# Patient Record
Sex: MALE | Race: OTHER | HISPANIC OR LATINO | URBAN - METROPOLITAN AREA
[De-identification: names, ages, dates, MRNs, and addresses within clinical notes are randomized per-mention and may not be internally consistent; named-entity substitution may affect disease eponyms.]

---

## 2017-03-21 ENCOUNTER — EMERGENCY (EMERGENCY)
Facility: HOSPITAL | Age: 32
LOS: 1 days | Discharge: HOME | End: 2017-03-21
Attending: EMERGENCY MEDICINE | Admitting: EMERGENCY MEDICINE
Payer: COMMERCIAL

## 2017-03-21 VITALS
OXYGEN SATURATION: 99 % | TEMPERATURE: 97 F | DIASTOLIC BLOOD PRESSURE: 95 MMHG | SYSTOLIC BLOOD PRESSURE: 146 MMHG | RESPIRATION RATE: 18 BRPM | HEART RATE: 76 BPM

## 2017-03-21 LAB
ALBUMIN SERPL ELPH-MCNC: 4.6 G/DL — SIGNIFICANT CHANGE UP (ref 3.3–5)
ALP SERPL-CCNC: 62 U/L — SIGNIFICANT CHANGE UP (ref 40–120)
ALT FLD-CCNC: 41 U/L — SIGNIFICANT CHANGE UP (ref 4–41)
AST SERPL-CCNC: 27 U/L — SIGNIFICANT CHANGE UP (ref 4–40)
BASOPHILS # BLD AUTO: 0.03 K/UL — SIGNIFICANT CHANGE UP (ref 0–0.2)
BASOPHILS NFR BLD AUTO: 0.4 % — SIGNIFICANT CHANGE UP (ref 0–2)
BILIRUB SERPL-MCNC: 1.5 MG/DL — HIGH (ref 0.2–1.2)
BUN SERPL-MCNC: 16 MG/DL — SIGNIFICANT CHANGE UP (ref 7–23)
CALCIUM SERPL-MCNC: 10 MG/DL — SIGNIFICANT CHANGE UP (ref 8.4–10.5)
CHLORIDE SERPL-SCNC: 101 MMOL/L — SIGNIFICANT CHANGE UP (ref 98–107)
CO2 SERPL-SCNC: 27 MMOL/L — SIGNIFICANT CHANGE UP (ref 22–31)
CREAT SERPL-MCNC: 1.11 MG/DL — SIGNIFICANT CHANGE UP (ref 0.5–1.3)
EOSINOPHIL # BLD AUTO: 0.11 K/UL — SIGNIFICANT CHANGE UP (ref 0–0.5)
EOSINOPHIL NFR BLD AUTO: 1.3 % — SIGNIFICANT CHANGE UP (ref 0–6)
GLUCOSE SERPL-MCNC: 82 MG/DL — SIGNIFICANT CHANGE UP (ref 70–99)
HBA1C BLD-MCNC: 6 % — HIGH (ref 4–5.6)
HCT VFR BLD CALC: 48.4 % — SIGNIFICANT CHANGE UP (ref 39–50)
HGB BLD-MCNC: 16.5 G/DL — SIGNIFICANT CHANGE UP (ref 13–17)
IMM GRANULOCYTES NFR BLD AUTO: 0.1 % — SIGNIFICANT CHANGE UP (ref 0–1.5)
LYMPHOCYTES # BLD AUTO: 3.2 K/UL — SIGNIFICANT CHANGE UP (ref 1–3.3)
LYMPHOCYTES # BLD AUTO: 38 % — SIGNIFICANT CHANGE UP (ref 13–44)
MCHC RBC-ENTMCNC: 30.9 PG — SIGNIFICANT CHANGE UP (ref 27–34)
MCHC RBC-ENTMCNC: 34.1 % — SIGNIFICANT CHANGE UP (ref 32–36)
MCV RBC AUTO: 90.6 FL — SIGNIFICANT CHANGE UP (ref 80–100)
MONOCYTES # BLD AUTO: 0.47 K/UL — SIGNIFICANT CHANGE UP (ref 0–0.9)
MONOCYTES NFR BLD AUTO: 5.6 % — SIGNIFICANT CHANGE UP (ref 2–14)
NEUTROPHILS # BLD AUTO: 4.59 K/UL — SIGNIFICANT CHANGE UP (ref 1.8–7.4)
NEUTROPHILS NFR BLD AUTO: 54.6 % — SIGNIFICANT CHANGE UP (ref 43–77)
PLATELET # BLD AUTO: 260 K/UL — SIGNIFICANT CHANGE UP (ref 150–400)
PMV BLD: 10.4 FL — SIGNIFICANT CHANGE UP (ref 7–13)
POTASSIUM SERPL-MCNC: 4.2 MMOL/L — SIGNIFICANT CHANGE UP (ref 3.5–5.3)
POTASSIUM SERPL-SCNC: 4.2 MMOL/L — SIGNIFICANT CHANGE UP (ref 3.5–5.3)
PROT SERPL-MCNC: 8.2 G/DL — SIGNIFICANT CHANGE UP (ref 6–8.3)
RBC # BLD: 5.34 M/UL — SIGNIFICANT CHANGE UP (ref 4.2–5.8)
RBC # FLD: 12.5 % — SIGNIFICANT CHANGE UP (ref 10.3–14.5)
SODIUM SERPL-SCNC: 142 MMOL/L — SIGNIFICANT CHANGE UP (ref 135–145)
WBC # BLD: 8.41 K/UL — SIGNIFICANT CHANGE UP (ref 3.8–10.5)
WBC # FLD AUTO: 8.41 K/UL — SIGNIFICANT CHANGE UP (ref 3.8–10.5)

## 2017-03-21 PROCEDURE — 70450 CT HEAD/BRAIN W/O DYE: CPT | Mod: 26

## 2017-03-21 PROCEDURE — 99220: CPT

## 2017-03-21 RX ORDER — SODIUM CHLORIDE 9 MG/ML
1000 INJECTION INTRAMUSCULAR; INTRAVENOUS; SUBCUTANEOUS ONCE
Qty: 0 | Refills: 0 | Status: COMPLETED | OUTPATIENT
Start: 2017-03-21 | End: 2017-03-21

## 2017-03-21 RX ORDER — ACETAMINOPHEN 500 MG
650 TABLET ORAL ONCE
Qty: 0 | Refills: 0 | Status: COMPLETED | OUTPATIENT
Start: 2017-03-21 | End: 2017-03-21

## 2017-03-21 RX ORDER — METOCLOPRAMIDE HCL 10 MG
10 TABLET ORAL ONCE
Qty: 0 | Refills: 0 | Status: COMPLETED | OUTPATIENT
Start: 2017-03-21 | End: 2017-03-21

## 2017-03-21 RX ORDER — SODIUM CHLORIDE 9 MG/ML
1000 INJECTION INTRAMUSCULAR; INTRAVENOUS; SUBCUTANEOUS
Qty: 0 | Refills: 0 | Status: DISCONTINUED | OUTPATIENT
Start: 2017-03-21 | End: 2017-03-25

## 2017-03-21 RX ADMIN — Medication 650 MILLIGRAM(S): at 14:26

## 2017-03-21 RX ADMIN — Medication 10 MILLIGRAM(S): at 14:26

## 2017-03-21 RX ADMIN — SODIUM CHLORIDE 1000 MILLILITER(S): 9 INJECTION INTRAMUSCULAR; INTRAVENOUS; SUBCUTANEOUS at 14:26

## 2017-03-21 RX ADMIN — SODIUM CHLORIDE 150 MILLILITER(S): 9 INJECTION INTRAMUSCULAR; INTRAVENOUS; SUBCUTANEOUS at 20:29

## 2017-03-21 NOTE — ED CDU PROVIDER NOTE - OBJECTIVE STATEMENT
30 y/o male with no significant PMHx presents to the ER c/o 2 weeks of intermittent headache with associated blurred vision.  Pt reports feeling fatigue and dizzy over the past 2 weeks.  Blurred vision only occurs with headache.  Pt wears both contacts and glasses.  Pt denies head trauma, loc, fevers, chills, recent illness, numbness, tingling.

## 2017-03-21 NOTE — ED CDU PROVIDER NOTE - ATTENDING CONTRIBUTION TO CARE
Giuseppe OAKLEY: 32 y/o M with no significant PMH here with 3 days of bilateral blurry vision, frontal headaches.  Pt reports sxs have been intermittent but persistent at home.  No known exacerbating or relieving factors.  Pt denies any h/o similar sxs in the past.  No recent illnesses or other complaints.  Pt wears glasses.  Evaluattion in ER with normal visual acuity/ophtho exam, CT head with possible intracranial hypotension.  Pt is currently asymptomatic with no vision changes or headache.  Well appearing, lying comfortably in stretcher, awake and alert, nontoxic.  VSS.  Lungs cta bl.  Cards nl S1/S2, RRR, no MRG.  Abd soft ntnd.  No focal neuro deficits.  Plan for ophtho and neuro eval, MRI brain, reassess.

## 2017-03-21 NOTE — ED CDU PROVIDER NOTE - PROGRESS NOTE DETAILS
CDU KATHARINA Ramos Addendum------  This patient was signed out to me by CDU KATHARINA Joseph and CDU attending Dr. Chin on 03/21/17 at 1900 hrs; test results reviewed.  In interim, pt has been resting comfortably; no complaints in interim.  Pt pending MRI study, stable at present; will continue to monitor / reassess. KATHARINA Huertas Discharge Note:  Pt endorses resolution of symptoms.  Pt has no active complaints at this time.  Repeat neuro exam benign without any e/o neuro deficits.  MRI normal.  Pt seen by neurology who recommends discharge, tylenol PRN and outpatient follow up.  Pt medically stable for discharge. CDU ATTG DISPO NOTE - Dr. Avilez - 32 yo M no PMHx who presented to the ED for headache associated with blurred vision x 2 weeks. CT Head suggestive of intracranial hypotension. Pt sent to CDU for MR imaging, neuro and opthalmology cs. In CDU pt symptomatically improved. Exam s/ focal deficits. MR imaging revealed no acute pathology. Consultant recs appreciated. Pt stable for discharge. PMD follow up within 24 - 48 hours. DC instructions and warning signs for return given. CDU ATTG DISPO NOTE - Dr. Avilez - 32 yo M no PMHx who presented to the ED for headache associated with blurred vision x 2 weeks. CT Head suggestive of intracranial hypotension. Pt sent to CDU for MR imaging, neuro and opthalmology cs. In CDU pt symptomatically improved. Exam s/ focal deficits. MR imaging revealed no acute pathology. Consultant recs appreciated. Pt stable for discharge. PMD follow up within 24 - 48 hours. DC instructions and warning signs for return given.    I Maylin M.D. have examined the patient and confirmed the essential components of the history, physical examination, diagnosis, and treatment plan. I agree with the patient's care as documented by the PA and amended herein by me. See note above for complete details of service.

## 2017-03-21 NOTE — ED CDU PROVIDER NOTE - PLAN OF CARE
Rest, drink plenty of fluids.  Advance activity as tolerated.  Continue all previously prescribed medications as directed.  Take Tylenol 650mg (Two 325 mg pills) every 4-6 hours as needed for pain. Follow up with your primary care physician and neurology (referral list provided) in 48-72 hours- bring copies of your results.  Return to the ER for worsening or persistent symptoms, and/or ANY NEW OR CONCERNING SYMPTOMS. If you have issues obtaining follow up, please call: [phone] to obtain a doctor or specialist who takes your insurance in your area.

## 2017-03-21 NOTE — ED PROVIDER NOTE - PROGRESS NOTE DETAILS
Hammad OAKLEY- given the CT head findings, there are concerns for intracranial hypotension, plan to call ophtho to r/o papilledema, plan to do MRI brain with contrast as further work up spoke to patient, and agree with MRI and CDU stay, called neurology as well

## 2017-03-21 NOTE — ED PROVIDER NOTE - PMH
Childhood Asthma  last attack @ 11 yrs old, pt denies any hospitalization for asthma.  Mass Right Thumb    Migraines    Obesity

## 2017-03-21 NOTE — ED PROVIDER NOTE - CRANIAL NERVE AND PUPILLARY EXAM
cranial nerves 2-12 intact central and peripheral vision intact/gag reflex intact/corneal reflex intact/cranial nerves 2-12 intact/cough reflex intact

## 2017-03-21 NOTE — ED PROVIDER NOTE - NS ED MD SCRIBE ATTENDING SCRIBE SECTIONS
DISPOSITION/PAST MEDICAL/SURGICAL/SOCIAL HISTORY/HISTORY OF PRESENT ILLNESS/PHYSICAL EXAM/VITAL SIGNS( Pullset)/REVIEW OF SYSTEMS

## 2017-03-22 VITALS
HEART RATE: 70 BPM | TEMPERATURE: 98 F | RESPIRATION RATE: 14 BRPM | SYSTOLIC BLOOD PRESSURE: 128 MMHG | OXYGEN SATURATION: 99 % | DIASTOLIC BLOOD PRESSURE: 65 MMHG

## 2017-03-22 PROCEDURE — 99217: CPT

## 2017-03-22 PROCEDURE — 70552 MRI BRAIN STEM W/DYE: CPT | Mod: 26

## 2017-03-22 PROCEDURE — 70551 MRI BRAIN STEM W/O DYE: CPT | Mod: 26,59

## 2017-03-22 RX ADMIN — SODIUM CHLORIDE 150 MILLILITER(S): 9 INJECTION INTRAMUSCULAR; INTRAVENOUS; SUBCUTANEOUS at 04:56

## 2018-06-11 ENCOUNTER — EMERGENCY (EMERGENCY)
Facility: HOSPITAL | Age: 33
LOS: 1 days | Discharge: HOME | End: 2018-06-11
Attending: EMERGENCY MEDICINE | Admitting: EMERGENCY MEDICINE
Payer: COMMERCIAL

## 2018-06-11 VITALS
RESPIRATION RATE: 16 BRPM | HEART RATE: 70 BPM | OXYGEN SATURATION: 100 % | TEMPERATURE: 98 F | SYSTOLIC BLOOD PRESSURE: 143 MMHG | DIASTOLIC BLOOD PRESSURE: 92 MMHG

## 2018-06-11 PROCEDURE — 99283 EMERGENCY DEPT VISIT LOW MDM: CPT

## 2018-06-11 RX ORDER — CEPHALEXIN 500 MG
1 CAPSULE ORAL
Qty: 20 | Refills: 0 | OUTPATIENT
Start: 2018-06-11 | End: 2018-06-20

## 2018-06-11 RX ORDER — HYDROCORTISONE 1 %
1 OINTMENT (GRAM) TOPICAL
Qty: 1 | Refills: 0 | OUTPATIENT
Start: 2018-06-11 | End: 2018-06-24

## 2018-06-11 NOTE — ED PROVIDER NOTE - MEDICAL DECISION MAKING DETAILS
Rash-previously diagnosed with Zoster on antivirals.  Rash inconsistent with Zoster and lack of pain inconsistent with Zoster.  Dermatitis, possible suprainfection given adenopathy.  Will cover with abx and topical steroids for pruritis.  Benadryl to help with itch if needed secondary to patient c/o difficulty sleeping secondary to itchy rash.  Possible 25-50 qHS OTC - DO NOT DRIVE ON THIS MEDICATION.

## 2018-06-11 NOTE — ED PROVIDER NOTE - CONDUCTED A DETAILED DISCUSSION WITH PATIENT AND/OR GUARDIAN REGARDING, MDM
need for outpatient follow-up return to ED if symptoms worsen, persist or questions arise/need for outpatient follow-up/Dermatology referral list provided.

## 2018-06-11 NOTE — ED ADULT TRIAGE NOTE - CHIEF COMPLAINT QUOTE
states" I have shingles to my left side of the face and now its spreading to my left eye and I feel swelling to my glands on my neck".
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
## 2021-04-07 ENCOUNTER — TRANSCRIPTION ENCOUNTER (OUTPATIENT)
Age: 36
End: 2021-04-07

## 2021-04-11 ENCOUNTER — TRANSCRIPTION ENCOUNTER (OUTPATIENT)
Age: 36
End: 2021-04-11

## 2021-04-19 ENCOUNTER — TRANSCRIPTION ENCOUNTER (OUTPATIENT)
Age: 36
End: 2021-04-19

## 2021-10-17 ENCOUNTER — EMERGENCY (EMERGENCY)
Facility: HOSPITAL | Age: 36
LOS: 1 days | Discharge: HOME | End: 2021-10-17
Attending: STUDENT IN AN ORGANIZED HEALTH CARE EDUCATION/TRAINING PROGRAM | Admitting: STUDENT IN AN ORGANIZED HEALTH CARE EDUCATION/TRAINING PROGRAM
Payer: SELF-PAY

## 2021-10-17 VITALS
DIASTOLIC BLOOD PRESSURE: 86 MMHG | TEMPERATURE: 98 F | HEART RATE: 70 BPM | SYSTOLIC BLOOD PRESSURE: 141 MMHG | OXYGEN SATURATION: 100 % | HEIGHT: 78 IN | RESPIRATION RATE: 16 BRPM

## 2021-10-17 VITALS
DIASTOLIC BLOOD PRESSURE: 72 MMHG | OXYGEN SATURATION: 100 % | TEMPERATURE: 99 F | RESPIRATION RATE: 17 BRPM | SYSTOLIC BLOOD PRESSURE: 130 MMHG | HEART RATE: 63 BPM

## 2021-10-17 PROBLEM — G43.909 MIGRAINE, UNSPECIFIED, NOT INTRACTABLE, WITHOUT STATUS MIGRAINOSUS: Chronic | Status: ACTIVE | Noted: 2017-03-21

## 2021-10-17 PROCEDURE — 73130 X-RAY EXAM OF HAND: CPT | Mod: 26,LT

## 2021-10-17 PROCEDURE — 99284 EMERGENCY DEPT VISIT MOD MDM: CPT

## 2021-10-17 RX ORDER — TETANUS TOXOID, REDUCED DIPHTHERIA TOXOID AND ACELLULAR PERTUSSIS VACCINE, ADSORBED 5; 2.5; 8; 8; 2.5 [IU]/.5ML; [IU]/.5ML; UG/.5ML; UG/.5ML; UG/.5ML
0.5 SUSPENSION INTRAMUSCULAR ONCE
Refills: 0 | Status: COMPLETED | OUTPATIENT
Start: 2021-10-17 | End: 2021-10-17

## 2021-10-17 RX ADMIN — TETANUS TOXOID, REDUCED DIPHTHERIA TOXOID AND ACELLULAR PERTUSSIS VACCINE, ADSORBED 0.5 MILLILITER(S): 5; 2.5; 8; 8; 2.5 SUSPENSION INTRAMUSCULAR at 15:01

## 2021-10-17 NOTE — ED ADULT TRIAGE NOTE - CHIEF COMPLAINT QUOTE
Pt. states he is a  and while cleaning was stuck in left thumb with a needle containing Pralidoxime chloride (nerve agent antidote). Was not injected with medication. c/o tingling to left arm.

## 2021-10-17 NOTE — ED PROVIDER NOTE - NSFOLLOWUPINSTRUCTIONS_ED_ALL_ED_FT
You were see You were seen today in the ED for a needlestick. Xrays were done to evaluate for any remaining foreign body. A prescription for Augmentin was sent to your pharmacy. Please take as prescribed. Tetanus prophylaxis was also provided.     Please return to the ED if you experience any of the following:   Worsening swelling, numbness, tingling, bleeding, limited mobility of hand or any concerning symptoms.

## 2021-10-17 NOTE — ED PROVIDER NOTE - NS ED ROS FT
CONSTITUTIONAL: No fevers, no chills, no lightheadedness, no dizziness  EYES: no visual changes, no eye pain  EARS: no ear drainage, no ear pain, no change in hearing  NOSE: no nasal congestion  MOUTH/THROAT: no sore throat  CV: No chest pain, no palpitations  RESP: No SOB, no cough  GI: No n/v/d, no abd pain  : no dysuria, no hematuria, no flank pain  MSK: no back pain, no extremity pain  SKIN: see HPI   NEURO: no headache, no focal weakness, no decreased sensation/parasthesias   PSYCHIATRIC: no known mental health issues

## 2021-10-17 NOTE — ED PROVIDER NOTE - OBJECTIVE STATEMENT
Patient is a 36 y/o M with no PMH who presents to the ED after being stuck with a needle from a Pralidoximine injection. Patient is a  at a school and was discarding items remaining from an EMT class. He place his finger on a Pralidoxamine injection and was struck on distal palmar surface of L thumb. States that he immediately moved his finger and minimal amount of agent was injected. Swelling has gone down over past two hours. No dizziness, blurriness, Patient is a 34 y/o M with no PMH who presents to the ED after being stuck with a needle from a Pralidoximine injection. Patient is a  at a school and was discarding items remaining from an EMT class. He place his finger on a Pralidoxamine injection and was struck on distal palmar surface of L thumb.Was not wearing gloves.  States that he immediately moved his finger and minimal amount of agent was injected. Swelling has gone down over past two hours. No dizziness, blurriness, hearing changes, n/v. Last tetanus 8 yrs ago.

## 2021-10-17 NOTE — ED PROVIDER NOTE - PHYSICAL EXAMINATION
General: Alert and Orientated x 3. No apparent distress.  Head: Normocephalic and atraumatic.  Eyes: PERRLA with EOMI.  Neck: Supple. Trachea midline.   Cardiac: Normal S1 and S2 w/ RRR. No murmurs appreciated. Pulses palpable.   Pulmonary: Vesicular breath sounds bilaterally. No increased WOB. No wheezes or crackles.  Abdominal: Soft, non-tender. (+) bowel sounds appreciated in all 4 quadrants. No hepatosplenomegaly.   Neurologic: No focal sensory or motor deficits.  Musculoskeletal: Strength appropriate in all 4 extremities for age with no limited ROM. L hand: puncture site on L palmar distal thumb with surrounding ecchymosis. Swelling L>R thumb. No bleeding. Extremity soft, non-tender.   Skin: Color appropriate for race. Intact, warm, and well-perfused.  Psychiatric: Appropriate mood and affect. No apparent risk to self or others.

## 2021-10-17 NOTE — ED PROVIDER NOTE - CLINICAL SUMMARY MEDICAL DECISION MAKING FREE TEXT BOX
36 y/o M with needle stick from paraldoxamine injection. No systemic signs at this time. There is swelling and bruising at L palmar distal thumb. Neurovascularly intact. Vitals stable. Will consult hand surgery and toxicology for recommendations. Xray hand for any foreign object. Tdap for prophylaxis. Dispo- pendiong consults and xray.

## 2021-10-17 NOTE — ED PROVIDER NOTE - NSICDXPASTMEDICALHX_GEN_ALL_CORE_FT
PAST MEDICAL HISTORY:  Childhood Asthma last attack @ 11 yrs old, pt denies any hospitalization for asthma.    Mass Right Thumb     Migraines     Obesity

## 2021-10-17 NOTE — ED PROVIDER NOTE - PATIENT PORTAL LINK FT
You can access the FollowMyHealth Patient Portal offered by [facility] by registering at the following website: [email or web address]. By joining [facility]’s FollowMyHealth portal, you will also be able to view your health information using other applications (apps) compatible with our system.

## 2021-10-17 NOTE — CONSULT NOTE ADULT - SUBJECTIVE AND OBJECTIVE BOX
MEDICAL TOXICOLOGY CONSULT    HPI: 35 Yr old male presents after accidental auto-injection of Pralidoxime 300mg to his right thumb at 10 am.    Per patient, was cleaning after a session conducted by EMS when this happened. Upon injection immediately withdrew his hand, and noted not all of the fluid was injected. Was not wearing any gloves during that time. Since then feels slight pain at site of injection. Denies any more symptoms.  The Pralidoxime auto-injector was  as of 2011.     ONSET / TIME of exposure(s): 10 AM    QUANTITY of exposure(s): Not applicable (Autoinjector contains a total of 2 ml, 300 mg/ml)    ROUTE of exposure: SQ injection     CONTEXT of exposure: at work     ASSOCIATED symptoms: Slight pain on site of injection     MEDICATION HISTORY: None    RECREATIONAL / ETHANOL / SUPPLEMENT USE: None    REVIEW OF SYSTEMS:       General:  no fever or fatigue  Eyes:  no blurry vision, double vision, or diminished acuity  ENT:  no decreased acuity, sore throat, dysphagia  Cardiac: no chest pain, syncope, or palpitations  Lung:  no cough, shortness of breath  GI:  no abdominal pain, nausea, vomiting, diarrhea, or constipation  Genitourinary: no dysuria  Skin: no rash, lesions, or pruritis  Neuro:  no headache, weakness/numbness, ataxia, change in speech, dizziness, tremor, or seizure    PHYSICAL EXAM    General:    Head:  normocephalic & atraumatic  Eyes:  extra-occular movement is intact  Pupils:  4 mm, symmetric, reactive to light  Ear, nose, throat:  mucosa is moist  Neck:  supple  Respiratory:  Normal effort, clear to auscultation bilaterally, no rales/ronchi/wheezing  Cardiac:  rate is 70, normal rhythm, no rubs/murmurs/gallops  Abdomen:  Soft, nondistended, nontender, +bowel sounds  :  deferred  Skin:  Normal  Neurologic:  Cranial nerves II-XII intact, No motor/sensory weakness.   LOCAL Wound exam: Local exam of left thumb, single injection point identified on the central volar aspect of distal phalange of thumb. No obvious swelling, bleeding, discharge. No restriction of joint movements. No pain on active/passive motion. No sensory weakness. No other similar injection related trauma noted.     Vital Signs Last 24 Hrs  T(C): 36.8 (17 Oct 2021 11:32), Max: 36.8 (17 Oct 2021 11:32)  T(F): 98.3 (17 Oct 2021 11:32), Max: 98.3 (17 Oct 2021 11:32)  HR: 70 (17 Oct 2021 11:32) (70 - 70)  BP: 141/86 (17 Oct 2021 11:32) (141/86 - 141/86)  RR: 16 (17 Oct 2021 11:32) (16 - 16)  SpO2: 100% (17 Oct 2021 11:32) (100% - 100%)

## 2021-10-17 NOTE — ED PROVIDER NOTE - PROGRESS NOTE DETAILS
Joel PGY 1: Spoke to Dr. De León, on call toxicology fellow. This is most likely local wound care, low concern fo systemic affect. Will come and see patient. Joel PGY 1: Spoke to on-call hand surgeon, Dr. Aaron Sepulveda, recommend to get xray, tetanus prophylaxis, and PO Augmentin for home. Pictures of agent and thumb sent. Joel PGY 1: Spoke to Dr. De León who evaluated patient. Minimal concern from a toxicological preceptive. This is likely localized injury, Xrays did not show retained object. Will give tdap and PO abx with return precautions.

## 2021-10-17 NOTE — CONSULT NOTE ADULT - ASSESSMENT
·	Patient remains asymptomatic.   ·	Would advise local wound care, and he can be cleared from Toxicology standpoint.     d/w Dr. Zhou Vu (Toxicology attending of record)     Thank you for the consult.

## 2021-10-17 NOTE — ED PROVIDER NOTE - ATTENDING CONTRIBUTION TO CARE
Yves Mitchell DO:  patient seen and evaluated with the resident.  I was present for key portions of the History & Physical, and I agree with the Impression & Plan. 34 yo m no reported pmh, pw needlestick. at approx 11 am pt was injected accidentally w/ pralidoxime injector, contained 2 ml within it, unclear how much was injected. reports pain and swelling to site which has improved by time of arrival to ed. pt was cleaning EMS display. states kit was . no systemic sx. no f/c, n/v, cp, sob, cough, ha. arrives hds, well appearing. mild erythema/swelling to pad of dip of thumb. compartment soft, cap refill <2 s, tissue soft. do not suspect high pressure injury however will call hand and tox, reassess.

## 2021-10-19 NOTE — ED POST DISCHARGE NOTE - RESULT SUMMARY
Xray Lt Hand : Cortical irregularity of the base of the fifth distal phlaynx and head of the 5th middle phalanx favored to represent old chronic fracture deformities or less likely minimally displaced acute FX. Patient contacted  admits to olf FX at 5 th digit. Patient told to follow up with Hand surgeon. Discussed with patient need to return to ED if symptoms don't continue to improve or recur or develops any new or worsening symptoms that are of concern.

## 2023-07-17 ENCOUNTER — NON-APPOINTMENT (OUTPATIENT)
Age: 38
End: 2023-07-17

## 2023-07-18 ENCOUNTER — APPOINTMENT (OUTPATIENT)
Dept: UROLOGY | Facility: CLINIC | Age: 38
End: 2023-07-18

## 2023-07-26 ENCOUNTER — APPOINTMENT (OUTPATIENT)
Dept: UROLOGY | Facility: CLINIC | Age: 38
End: 2023-07-26

## 2023-08-21 ENCOUNTER — APPOINTMENT (OUTPATIENT)
Dept: UROLOGY | Facility: CLINIC | Age: 38
End: 2023-08-21

## 2023-09-13 ENCOUNTER — APPOINTMENT (OUTPATIENT)
Dept: UROLOGY | Facility: CLINIC | Age: 38
End: 2023-09-13
Payer: COMMERCIAL

## 2023-09-13 VITALS
DIASTOLIC BLOOD PRESSURE: 71 MMHG | OXYGEN SATURATION: 97 % | WEIGHT: 249 LBS | HEIGHT: 72 IN | HEART RATE: 74 BPM | SYSTOLIC BLOOD PRESSURE: 138 MMHG | BODY MASS INDEX: 33.72 KG/M2 | TEMPERATURE: 97.7 F

## 2023-09-13 PROCEDURE — 99204 OFFICE O/P NEW MOD 45 MIN: CPT

## 2023-11-09 ENCOUNTER — APPOINTMENT (OUTPATIENT)
Dept: UROLOGY | Facility: CLINIC | Age: 38
End: 2023-11-09
Payer: COMMERCIAL

## 2023-11-09 VITALS
BODY MASS INDEX: 33.77 KG/M2 | DIASTOLIC BLOOD PRESSURE: 78 MMHG | HEART RATE: 69 BPM | SYSTOLIC BLOOD PRESSURE: 130 MMHG | RESPIRATION RATE: 15 BRPM | OXYGEN SATURATION: 98 % | WEIGHT: 249 LBS | TEMPERATURE: 98.1 F

## 2023-11-09 DIAGNOSIS — Z30.2 ENCOUNTER FOR STERILIZATION: ICD-10-CM

## 2023-11-09 PROCEDURE — 55250 REMOVAL OF SPERM DUCT(S): CPT

## 2023-11-16 ENCOUNTER — APPOINTMENT (OUTPATIENT)
Dept: UROLOGY | Facility: CLINIC | Age: 38
End: 2023-11-16
Payer: COMMERCIAL

## 2023-11-16 VITALS
HEIGHT: 72 IN | OXYGEN SATURATION: 98 % | TEMPERATURE: 98.7 F | SYSTOLIC BLOOD PRESSURE: 130 MMHG | HEART RATE: 80 BPM | WEIGHT: 249 LBS | DIASTOLIC BLOOD PRESSURE: 67 MMHG | BODY MASS INDEX: 33.72 KG/M2

## 2023-11-16 DIAGNOSIS — Z30.09 ENCOUNTER FOR OTHER GENERAL COUNSELING AND ADVICE ON CONTRACEPTION: ICD-10-CM

## 2023-11-16 PROCEDURE — 99024 POSTOP FOLLOW-UP VISIT: CPT

## 2024-06-19 ENCOUNTER — APPOINTMENT (OUTPATIENT)
Dept: UROLOGY | Facility: CLINIC | Age: 39
End: 2024-06-19

## 2024-06-26 ENCOUNTER — APPOINTMENT (OUTPATIENT)
Dept: UROLOGY | Facility: CLINIC | Age: 39
End: 2024-06-26
Payer: COMMERCIAL

## 2024-06-26 VITALS
BODY MASS INDEX: 32.51 KG/M2 | DIASTOLIC BLOOD PRESSURE: 81 MMHG | WEIGHT: 240 LBS | OXYGEN SATURATION: 98 % | HEIGHT: 72 IN | TEMPERATURE: 97.4 F | SYSTOLIC BLOOD PRESSURE: 133 MMHG | HEART RATE: 67 BPM

## 2024-06-26 DIAGNOSIS — R10.2 PELVIC AND PERINEAL PAIN: ICD-10-CM

## 2024-06-26 PROCEDURE — 99214 OFFICE O/P EST MOD 30 MIN: CPT

## 2024-06-26 RX ORDER — NAPROXEN 500 MG/1
500 TABLET ORAL
Qty: 30 | Refills: 0 | Status: ACTIVE | COMMUNITY
Start: 2024-06-26 | End: 1900-01-01

## 2024-06-27 LAB
APPEARANCE: CLEAR
BACTERIA: NEGATIVE /HPF
BILIRUBIN URINE: NEGATIVE
BLOOD URINE: NEGATIVE
CAST: 0 /LPF
COLOR: YELLOW
EPITHELIAL CELLS: 1 /HPF
GLUCOSE QUALITATIVE U: NEGATIVE MG/DL
KETONES URINE: NEGATIVE MG/DL
LEUKOCYTE ESTERASE URINE: NEGATIVE
MICROSCOPIC-UA: NORMAL
NITRITE URINE: NEGATIVE
PH URINE: 7.5
PROTEIN URINE: 30 MG/DL
RED BLOOD CELLS URINE: 0 /HPF
SPECIFIC GRAVITY URINE: 1.02
UROBILINOGEN URINE: 1 MG/DL
WHITE BLOOD CELLS URINE: 1 /HPF

## 2024-07-01 LAB — BACTERIA UR CULT: NORMAL

## 2024-07-11 ENCOUNTER — APPOINTMENT (OUTPATIENT)
Dept: UROLOGY | Facility: CLINIC | Age: 39
End: 2024-07-11